# Patient Record
Sex: MALE | Race: OTHER | NOT HISPANIC OR LATINO
[De-identification: names, ages, dates, MRNs, and addresses within clinical notes are randomized per-mention and may not be internally consistent; named-entity substitution may affect disease eponyms.]

---

## 2023-06-14 ENCOUNTER — APPOINTMENT (OUTPATIENT)
Dept: INTERNAL MEDICINE | Facility: CLINIC | Age: 23
End: 2023-06-14
Payer: COMMERCIAL

## 2023-06-14 DIAGNOSIS — Z02.1 ENCOUNTER FOR PRE-EMPLOYMENT EXAMINATION: ICD-10-CM

## 2023-06-14 DIAGNOSIS — Z78.9 OTHER SPECIFIED HEALTH STATUS: ICD-10-CM

## 2023-06-14 PROCEDURE — 99203 OFFICE O/P NEW LOW 30 MIN: CPT | Mod: 95

## 2023-06-14 NOTE — PHYSICAL EXAM
[de-identified] : limited due to telehealth.  speaking in complete sentences in no acute distress

## 2023-06-14 NOTE — HISTORY OF PRESENT ILLNESS
[Home] : at home, [unfilled] , at the time of the visit. [Medical Office: ([facility])___] : at the medical office located in  [Verbal consent obtained from patient] : the patient, [unfilled] [FreeTextEntry8] : 23 year old male needs drug screening for pre-employment with [employer]\par \par no acute issues \par \par got annual done earlier this year

## 2024-08-14 ENCOUNTER — APPOINTMENT (OUTPATIENT)
Dept: INTERNAL MEDICINE | Facility: CLINIC | Age: 24
End: 2024-08-14
Payer: COMMERCIAL

## 2024-08-14 DIAGNOSIS — K52.9 NONINFECTIVE GASTROENTERITIS AND COLITIS, UNSPECIFIED: ICD-10-CM

## 2024-08-14 PROCEDURE — 99213 OFFICE O/P EST LOW 20 MIN: CPT

## 2024-08-14 NOTE — ASSESSMENT
[FreeTextEntry1] : //  -zofran as needed -Advised to drink Pedialyte, Gatorade, and/or Ginger ale as its better tolerated when the stomach has been irritated as opposed to plain water -Nature of disease and action of medications reviewed with patient  -Advised if symptoms worsen, develop rigid abdomen, localized tenderness, severe persistent vomiting, fever or lethargy to go directly to ED for further evaluation. -All questions and concerns addressed with patient in detail.  patient verbalized instructions back to me in his/her own words

## 2024-08-14 NOTE — HISTORY OF PRESENT ILLNESS
[Home] : at home, [unfilled] , at the time of the visit. [Medical Office: ([facility])___] : at the medical office located in  [Verbal consent obtained from patient] : the patient, [unfilled] [FreeTextEntry8] : 24 year old male presents complaining of diarrhea and nausea for the last 2 days. vomiting, non-bloody vomitus today.  difficulty keeping food down, tolerating liquids. no fever/chills, chest pain or soboe.  no sick contact or recent travel.

## 2024-08-14 NOTE — PHYSICAL EXAM
[de-identified] : limited due to telehealth.  speaking in complete sentences in no acute distress

## 2025-01-22 ENCOUNTER — APPOINTMENT (OUTPATIENT)
Dept: INTERNAL MEDICINE | Facility: CLINIC | Age: 25
End: 2025-01-22
Payer: COMMERCIAL

## 2025-01-22 DIAGNOSIS — K52.9 NONINFECTIVE GASTROENTERITIS AND COLITIS, UNSPECIFIED: ICD-10-CM

## 2025-01-22 PROCEDURE — 99213 OFFICE O/P EST LOW 20 MIN: CPT | Mod: 95

## 2025-01-22 PROCEDURE — G2211 COMPLEX E/M VISIT ADD ON: CPT | Mod: NC
